# Patient Record
Sex: FEMALE | Race: WHITE | URBAN - METROPOLITAN AREA
[De-identification: names, ages, dates, MRNs, and addresses within clinical notes are randomized per-mention and may not be internally consistent; named-entity substitution may affect disease eponyms.]

---

## 2022-07-25 DIAGNOSIS — Z11.59 ENCOUNTER FOR HEPATITIS C SCREENING TEST FOR LOW RISK PATIENT: ICD-10-CM

## 2022-07-25 DIAGNOSIS — Z13.0 SCREENING, ANEMIA, DEFICIENCY, IRON: ICD-10-CM

## 2022-07-25 DIAGNOSIS — Z13.6 SCREENING, ISCHEMIC HEART DISEASE: Primary | ICD-10-CM

## 2022-07-25 DIAGNOSIS — Z13.1 SCREENING FOR DIABETES MELLITUS: ICD-10-CM

## 2022-07-28 ENCOUNTER — OFFICE VISIT (OUTPATIENT)
Dept: INTERNAL MEDICINE CLINIC | Age: 32
End: 2022-07-28
Payer: COMMERCIAL

## 2022-07-28 VITALS
RESPIRATION RATE: 20 BRPM | HEIGHT: 67 IN | HEART RATE: 97 BPM | BODY MASS INDEX: 24.74 KG/M2 | TEMPERATURE: 96.8 F | SYSTOLIC BLOOD PRESSURE: 111 MMHG | DIASTOLIC BLOOD PRESSURE: 71 MMHG | WEIGHT: 157.6 LBS | OXYGEN SATURATION: 99 %

## 2022-07-28 DIAGNOSIS — F32.A DEPRESSION, UNSPECIFIED DEPRESSION TYPE: Primary | ICD-10-CM

## 2022-07-28 DIAGNOSIS — N92.6 IRREGULAR PERIODS: ICD-10-CM

## 2022-07-28 DIAGNOSIS — Z76.89 ESTABLISHING CARE WITH NEW DOCTOR, ENCOUNTER FOR: ICD-10-CM

## 2022-07-28 DIAGNOSIS — Z97.5 IUD (INTRAUTERINE DEVICE) IN PLACE: ICD-10-CM

## 2022-07-28 PROCEDURE — 99204 OFFICE O/P NEW MOD 45 MIN: CPT | Performed by: STUDENT IN AN ORGANIZED HEALTH CARE EDUCATION/TRAINING PROGRAM

## 2022-07-28 RX ORDER — ESCITALOPRAM OXALATE 10 MG/1
10 TABLET ORAL DAILY
Qty: 90 TABLET | Refills: 0 | Status: SHIPPED | OUTPATIENT
Start: 2022-07-28 | End: 2022-10-24 | Stop reason: SDUPTHER

## 2022-07-28 RX ORDER — ESCITALOPRAM OXALATE 10 MG/1
10 TABLET ORAL DAILY
COMMUNITY
Start: 2022-06-30 | End: 2022-07-28 | Stop reason: SDUPTHER

## 2022-07-28 RX ORDER — ONDANSETRON 4 MG/1
TABLET, ORALLY DISINTEGRATING ORAL
COMMUNITY
Start: 2021-10-13 | End: 2022-07-28

## 2022-07-28 NOTE — PROGRESS NOTES
Gilbert Fletcher is a 29 y.o. female (: 1990) presenting to address:    Chief Complaint   Patient presents with    New Patient    Establish Care    Depression       Vitals:    22 0809   BP: 111/71   Pulse: 97   Resp: 20   Temp: 96.8 °F (36 °C)   TempSrc: Temporal   SpO2: 99%   Weight: 157 lb 9.6 oz (71.5 kg)   Height: 5' 6.54\" (1.69 m)   PainSc:   0 - No pain       Hearing/Vision:   No results found. Learning Assessment:   No flowsheet data found. Depression Screening:     3 most recent PHQ Screens 2022   Little interest or pleasure in doing things Not at all   Feeling down, depressed, irritable, or hopeless Not at all   Total Score PHQ 2 0     Fall Risk Assessment:   No flowsheet data found. Abuse Screening:   No flowsheet data found. Coordination of Care Questionaire:     Advanced Directive:   1. Do you have an Advanced Directive? NO    2. Would you like information on Advanced Directives? NO    1. \"Have you been to the ER, urgent care clinic since your last visit? Hospitalized since your last visit? \" No    2. \"Have you seen or consulted any other health care providers outside of the [address] [place] [address] since your last visit? \" No     3. For patients aged 39-70: Has the patient had a colonoscopy? No     If the patient is female:    4. For patients aged 41-77: Has the patient had a mammogram within the past 2 years? No    5. For patients aged 21-65: Has the patient had a pap smear?  No

## 2022-07-28 NOTE — PROGRESS NOTES
HISTORY OF PRESENT ILLNESS  Chrissy Burt is a 29 y.o. female. New pt here to [facility]    PMHx: Depression, PTSD  Denies tobacco , occasional ETOH  GYN- Irregular. Mirena for 4 yrs. Casandra had PAP in the last 3 yrs    Depression-Two months ago experienced a mental health emergency, at that time she was referred through her insurance to see a psychriatist through [facility] but stopped after receiving 3 sessions and insurance no longer covering. Was Rx Lexapro 10mg which shes been taking for a month. Reports that it has been helping and casandra had another episode. F/w therapist weekly. In the past has been on numerous SSRIs. Depression is more prominent that Anxiety. Also hx of PTSD    Review of Systems   HENT:  Negative for hearing loss. Eyes:  Negative for blurred vision. Respiratory:  Negative for shortness of breath. Cardiovascular:  Negative for palpitations. Gastrointestinal:  Negative for abdominal pain, blood in stool and nausea. Genitourinary:  Negative for hematuria. Neurological:  Negative for dizziness and headaches. /71 (BP 1 Location: Right upper arm, BP Patient Position: Sitting, BP Cuff Size: Adult)   Pulse 97   Temp 96.8 °F (36 °C) (Temporal)   Resp 20   Ht 5' 6.54\" (1.69 m)   Wt 157 lb 9.6 oz (71.5 kg)   SpO2 99%   BMI 25.03 kg/m²     Physical Exam  Vitals reviewed. Constitutional:       Appearance: Normal appearance. HENT:      Right Ear: Tympanic membrane normal.      Left Ear: Tympanic membrane normal.      Mouth/Throat:      Comments: MASK  Eyes:      Conjunctiva/sclera: Conjunctivae normal.      Pupils: Pupils are equal, round, and reactive to light. Cardiovascular:      Rate and Rhythm: Normal rate and regular rhythm. Pulses: Normal pulses. Heart sounds: Normal heart sounds. Pulmonary:      Effort: Pulmonary effort is normal.      Breath sounds: Normal breath sounds. Abdominal:      General: Abdomen is flat.    Psychiatric:         Mood and Affect: Mood normal.       ASSESSMENT and PLAN    ICD-10-CM ICD-9-CM    1. Depression, unspecified depression type  F32. A 311 escitalopram oxalate (LEXAPRO) 10 mg tablet      2. Irregular periods  N92.6 626.4       3. IUD (intrauterine device) in place  Z97.5 V45.51 REFERRAL TO GYNECOLOGY      4. Establishing care with new doctor, encounter for  Z76.89 V65.8       Depression stable. Continue lexapro 10mg. Renewed today. Encouraged to c/w therapy. Referral to GYN for irregular periods and discussion of removing IUD  Follow-up and Dispositions    Return in about 3 months (around 10/28/2022) for Depression.

## 2022-07-29 LAB
A-G RATIO,AGRAT: 1.8 RATIO (ref 1.1–2.6)
ABSOLUTE LYMPHOCYTE COUNT, 10803: 1.6 K/UL (ref 1–4.8)
ALBUMIN SERPL-MCNC: 4.8 G/DL (ref 3.5–5)
ALP SERPL-CCNC: 60 U/L (ref 25–115)
ALT SERPL-CCNC: 15 U/L (ref 5–40)
ANION GAP SERPL CALC-SCNC: 18 MMOL/L (ref 3–15)
AST SERPL W P-5'-P-CCNC: 18 U/L (ref 10–37)
AVG GLU, 10930: 119 MG/DL (ref 91–123)
BASOPHILS # BLD: 0.1 K/UL (ref 0–0.2)
BASOPHILS NFR BLD: 1 % (ref 0–2)
BILIRUB SERPL-MCNC: 0.6 MG/DL (ref 0.2–1.2)
BUN SERPL-MCNC: 16 MG/DL (ref 6–22)
CALCIUM SERPL-MCNC: 9.7 MG/DL (ref 8.4–10.5)
CHLORIDE SERPL-SCNC: 102 MMOL/L (ref 98–110)
CHOLEST SERPL-MCNC: 156 MG/DL (ref 110–200)
CO2 SERPL-SCNC: 26 MMOL/L (ref 20–32)
CREAT SERPL-MCNC: 0.7 MG/DL (ref 0.5–1.2)
EOSINOPHIL # BLD: 0.2 K/UL (ref 0–0.5)
EOSINOPHIL NFR BLD: 3 % (ref 0–6)
ERYTHROCYTE [DISTWIDTH] IN BLOOD BY AUTOMATED COUNT: 12.9 % (ref 10–15.5)
GLOBULIN,GLOB: 2.6 G/DL (ref 2–4)
GLOMERULAR FILTRATION RATE: >60 ML/MIN/1.73 SQ.M.
GLUCOSE SERPL-MCNC: 121 MG/DL (ref 70–99)
GRANULOCYTES,GRANS: 64 % (ref 40–75)
HBA1C MFR BLD HPLC: 5.8 % (ref 4.8–5.6)
HCT VFR BLD AUTO: 46.5 % (ref 35.1–46.5)
HCV AB SER IA-ACNC: NORMAL
HDLC SERPL-MCNC: 3.5 MG/DL (ref 0–5)
HDLC SERPL-MCNC: 44 MG/DL
HGB BLD-MCNC: 14.1 G/DL (ref 11.7–15.5)
LDL/HDL RATIO,LDHD: 2
LDLC SERPL CALC-MCNC: 90 MG/DL (ref 50–99)
LYMPHOCYTES, LYMLT: 25 % (ref 20–45)
MCH RBC QN AUTO: 32 PG (ref 26–34)
MCHC RBC AUTO-ENTMCNC: 30 G/DL (ref 31–36)
MCV RBC AUTO: 106 FL (ref 80–99)
MONOCYTES # BLD: 0.4 K/UL (ref 0.1–1)
MONOCYTES NFR BLD: 7 % (ref 3–12)
NEUTROPHILS # BLD AUTO: 4.3 K/UL (ref 1.8–7.7)
NON-HDL CHOLESTEROL, 011976: 112 MG/DL
PLATELET # BLD AUTO: 238 K/UL (ref 140–440)
PMV BLD AUTO: 11.4 FL (ref 9–13)
POTASSIUM SERPL-SCNC: 3.9 MMOL/L (ref 3.5–5.5)
PROT SERPL-MCNC: 7.4 G/DL (ref 6.4–8.3)
RBC # BLD AUTO: 4.38 M/UL (ref 3.8–5.2)
SODIUM SERPL-SCNC: 146 MMOL/L (ref 133–145)
TRIGL SERPL-MCNC: 111 MG/DL (ref 40–149)
VLDLC SERPL CALC-MCNC: 22 MG/DL (ref 8–30)
WBC # BLD AUTO: 6.6 K/UL (ref 4–11)

## 2022-07-29 NOTE — PROGRESS NOTES
The results of your blood work shows that your A1C is in the prediabetic range and your blood sugar is mildly elevated. I suggest that you start to implement healthy lifestyle changes with modifications in your diet by cutting out refined sugars, substituting whole wheat for white bread and eating more fresh fruits and vegetables. In addition you should work on getting 150 min of aerobic exercise a week. We will repeat this in 6 months to assess your progress.

## 2022-10-24 ENCOUNTER — OFFICE VISIT (OUTPATIENT)
Dept: INTERNAL MEDICINE CLINIC | Age: 32
End: 2022-10-24
Payer: COMMERCIAL

## 2022-10-24 VITALS
OXYGEN SATURATION: 98 % | DIASTOLIC BLOOD PRESSURE: 71 MMHG | WEIGHT: 169 LBS | TEMPERATURE: 96.8 F | SYSTOLIC BLOOD PRESSURE: 102 MMHG | RESPIRATION RATE: 18 BRPM | HEART RATE: 81 BPM | BODY MASS INDEX: 26.53 KG/M2 | HEIGHT: 67 IN

## 2022-10-24 DIAGNOSIS — F32.A DEPRESSION, UNSPECIFIED DEPRESSION TYPE: Primary | ICD-10-CM

## 2022-10-24 DIAGNOSIS — R00.2 PALPITATIONS: ICD-10-CM

## 2022-10-24 DIAGNOSIS — F41.9 ANXIETY: ICD-10-CM

## 2022-10-24 DIAGNOSIS — Z23 NEEDS FLU SHOT: ICD-10-CM

## 2022-10-24 DIAGNOSIS — R20.9 SENSATION OF COLD IN LOWER EXTREMITY: ICD-10-CM

## 2022-10-24 DIAGNOSIS — R42 DIZZINESS: ICD-10-CM

## 2022-10-24 PROCEDURE — 90471 IMMUNIZATION ADMIN: CPT | Performed by: STUDENT IN AN ORGANIZED HEALTH CARE EDUCATION/TRAINING PROGRAM

## 2022-10-24 PROCEDURE — 4274F FLU IMMUNO ADMIND RCVD: CPT | Performed by: STUDENT IN AN ORGANIZED HEALTH CARE EDUCATION/TRAINING PROGRAM

## 2022-10-24 PROCEDURE — 90686 IIV4 VACC NO PRSV 0.5 ML IM: CPT | Performed by: STUDENT IN AN ORGANIZED HEALTH CARE EDUCATION/TRAINING PROGRAM

## 2022-10-24 PROCEDURE — 99214 OFFICE O/P EST MOD 30 MIN: CPT | Performed by: STUDENT IN AN ORGANIZED HEALTH CARE EDUCATION/TRAINING PROGRAM

## 2022-10-24 RX ORDER — ESCITALOPRAM OXALATE 20 MG/1
20 TABLET ORAL DAILY
Qty: 90 TABLET | Refills: 0 | Status: SHIPPED | OUTPATIENT
Start: 2022-10-24

## 2022-10-24 RX ORDER — ESCITALOPRAM OXALATE 10 MG/1
10 TABLET ORAL DAILY
Qty: 90 TABLET | Refills: 0 | Status: SHIPPED | OUTPATIENT
Start: 2022-10-24 | End: 2022-10-24

## 2022-10-24 NOTE — PROGRESS NOTES
Erlinda Joseph is a 29 y.o. female (: 1990) presenting to address:    Chief Complaint   Patient presents with    Depression    Medication Refill       Vitals:    10/24/22 0728   BP: 102/71   Pulse: 81   Resp: 18   Temp: 96.8 °F (36 °C)   TempSrc: Temporal   SpO2: 98%   Weight: 169 lb (76.7 kg)   Height: 5' 6.54\" (1.69 m)   PainSc:   0 - No pain   LMP: 10/17/2022       Hearing/Vision:   No results found. Learning Assessment:   No flowsheet data found. Depression Screening:     3 most recent PHQ Screens 10/24/2022   Little interest or pleasure in doing things Not at all   Feeling down, depressed, irritable, or hopeless Not at all   Total Score PHQ 2 0     Fall Risk Assessment:   No flowsheet data found. Abuse Screening:   No flowsheet data found. Coordination of Care Questionaire:     Advanced Directive:   1. Do you have an Advanced Directive? NO    2. Would you like information on Advanced Directives? NO    1. \"Have you been to the ER, urgent care clinic since your last visit? Hospitalized since your last visit? \" No    2. \"Have you seen or consulted any other health care providers outside of the [address] [place] [address] since your last visit? \" Yes ob/gyn     3. For patients aged 39-70: Has the patient had a colonoscopy? No     If the patient is female:    4. For patients aged 41-77: Has the patient had a mammogram within the past 2 years? No    5. For patients aged 21-65: Has the patient had a pap smear?  Yes - no Care Gap present

## 2022-10-24 NOTE — PROGRESS NOTES
HISTORY OF PRESENT ILLNESS  Yuli Bryan is a 29 y.o. female. Here to fu depression    Depression-Started lexapro at initial visit 3 mo ago. Reports no problems with medication and is tolerating well. Does admit to panic attacks 1x/week first thing in the am which shes noticed increasing over that last few weeks. Still meeting with therapist weekly    Occasional numb/tingling in L hand. Feet also always cold. Experiecnings dizziness and racing heart 1-2xs week and mainly when she hasnt eaten or if she has been sitting for long periods of time. Review of Systems   HENT:  Negative for hearing loss. Eyes:  Negative for blurred vision. Respiratory:  Negative for shortness of breath. Cardiovascular:  Positive for palpitations. Negative for chest pain and leg swelling. Chest tightness(anxiety)   Gastrointestinal:  Negative for abdominal pain, diarrhea, nausea and vomiting. Neurological:  Negative for dizziness. Physical Exam  Vitals reviewed. Constitutional:       Appearance: Normal appearance. HENT:      Mouth/Throat:      Comments: MASK  Cardiovascular:      Rate and Rhythm: Normal rate and regular rhythm. Pulses: Normal pulses. Heart sounds: Normal heart sounds. Pulmonary:      Effort: Pulmonary effort is normal.      Breath sounds: Normal breath sounds. Abdominal:      General: Abdomen is flat. Psychiatric:         Mood and Affect: Mood normal.       ASSESSMENT and PLAN    ICD-10-CM ICD-9-CM    1. Depression, unspecified depression type  F32. A 311 escitalopram oxalate (LEXAPRO) 20 mg tablet      DISCONTINUED: escitalopram oxalate (LEXAPRO) 10 mg tablet      2. Sensation of cold in lower extremity  R20.9 782.9 FERRITIN      IRON PROFILE      3. Dizziness  R42 780.4 TSH AND FREE T4      4.  Palpitations  R00.2 785.1 REFERRAL TO CARDIOLOGY      5. Needs flu shot  Z23 V04.81 INFLUENZA, FLUARIX, FLULAVAL, FLUZONE (AGE 6 MO+), AFLURIA(AGE 3Y+) IM, PF, 0.5 ML      Depression stable but appears anxiety is increasing. Will increase Lexapro to 20mg today. Encouraged to continue to fw therapy  Palpitations most likely related to anxiety, TSH ordered. Will est care with cardio to ro any cardiac abnormalities with possible 30d holter and ECHO  Will order ferritin and iron to rule out anemia. Suspect lower blood pressures and orthostatic HTN related to dizziness  Received flu shot today  Follow-up and Dispositions    Return in about 3 months (around 1/24/2023).

## 2022-10-24 NOTE — PROGRESS NOTES
Flu Immunization/s administered 10/24/2022 by Mehnaz Benton with consent. Patient tolerated procedure well. No reactions noted.

## 2022-10-25 LAB
FE % SATURATION,PSAT: 28 % (ref 20–50)
FERRITIN SERPL-MCNC: 135 NG/ML (ref 10–291)
IRON,IRN: 81 MCG/DL (ref 30–160)
T4 FREE SERPL-MCNC: 1.4 NG/DL (ref 0.9–1.8)
TIBC,TIBC: 289 MCG/DL (ref 228–428)
TSH SERPL DL<=0.005 MIU/L-ACNC: 1.72 MCU/ML (ref 0.27–4.2)
UIBC SERPL-MCNC: 208 MCG/DL (ref 110–370)